# Patient Record
Sex: MALE | Race: WHITE | NOT HISPANIC OR LATINO | URBAN - METROPOLITAN AREA
[De-identification: names, ages, dates, MRNs, and addresses within clinical notes are randomized per-mention and may not be internally consistent; named-entity substitution may affect disease eponyms.]

---

## 2017-01-19 ENCOUNTER — AMBULATORY - HEALTHEAST (OUTPATIENT)
Dept: FAMILY MEDICINE | Facility: CLINIC | Age: 57
End: 2017-01-19

## 2017-01-19 ENCOUNTER — COMMUNICATION - HEALTHEAST (OUTPATIENT)
Dept: FAMILY MEDICINE | Facility: CLINIC | Age: 57
End: 2017-01-19

## 2017-01-19 DIAGNOSIS — M54.50 LOWER BACK PAIN: ICD-10-CM

## 2017-01-19 DIAGNOSIS — I10 HTN (HYPERTENSION): ICD-10-CM

## 2017-01-19 DIAGNOSIS — M54.16 LUMBAR RADICULOPATHY: ICD-10-CM

## 2017-01-19 DIAGNOSIS — E78.00 PURE HYPERCHOLESTEROLEMIA: ICD-10-CM

## 2017-04-25 ENCOUNTER — OFFICE VISIT - HEALTHEAST (OUTPATIENT)
Dept: FAMILY MEDICINE | Facility: CLINIC | Age: 57
End: 2017-04-25

## 2017-04-25 DIAGNOSIS — J06.9 VIRAL URI WITH COUGH: ICD-10-CM

## 2017-04-25 DIAGNOSIS — Z20.818 EXPOSURE TO STREP THROAT: ICD-10-CM

## 2017-07-16 ENCOUNTER — COMMUNICATION - HEALTHEAST (OUTPATIENT)
Dept: FAMILY MEDICINE | Facility: CLINIC | Age: 57
End: 2017-07-16

## 2017-07-16 DIAGNOSIS — I10 HTN (HYPERTENSION): ICD-10-CM

## 2017-07-17 ENCOUNTER — COMMUNICATION - HEALTHEAST (OUTPATIENT)
Dept: FAMILY MEDICINE | Facility: CLINIC | Age: 57
End: 2017-07-17

## 2017-07-17 ENCOUNTER — AMBULATORY - HEALTHEAST (OUTPATIENT)
Dept: FAMILY MEDICINE | Facility: CLINIC | Age: 57
End: 2017-07-17

## 2017-07-17 DIAGNOSIS — M54.16 LUMBAR RADICULOPATHY: ICD-10-CM

## 2017-07-17 DIAGNOSIS — M54.50 LOWER BACK PAIN: ICD-10-CM

## 2017-08-05 ENCOUNTER — COMMUNICATION - HEALTHEAST (OUTPATIENT)
Dept: FAMILY MEDICINE | Facility: CLINIC | Age: 57
End: 2017-08-05

## 2017-08-05 DIAGNOSIS — K21.9 GERD (GASTROESOPHAGEAL REFLUX DISEASE): ICD-10-CM

## 2017-12-12 ENCOUNTER — OFFICE VISIT - HEALTHEAST (OUTPATIENT)
Dept: FAMILY MEDICINE | Facility: CLINIC | Age: 57
End: 2017-12-12

## 2017-12-12 DIAGNOSIS — M25.561 ACUTE PAIN OF RIGHT KNEE: ICD-10-CM

## 2017-12-12 DIAGNOSIS — E66.3 OVERWEIGHT (BMI 25.0-29.9): ICD-10-CM

## 2017-12-12 ASSESSMENT — MIFFLIN-ST. JEOR: SCORE: 1605.15

## 2018-08-30 ENCOUNTER — OFFICE VISIT - HEALTHEAST (OUTPATIENT)
Dept: FAMILY MEDICINE | Facility: CLINIC | Age: 58
End: 2018-08-30

## 2018-08-30 ENCOUNTER — COMMUNICATION - HEALTHEAST (OUTPATIENT)
Dept: SCHEDULING | Facility: CLINIC | Age: 58
End: 2018-08-30

## 2018-08-30 ENCOUNTER — COMMUNICATION - HEALTHEAST (OUTPATIENT)
Dept: FAMILY MEDICINE | Facility: CLINIC | Age: 58
End: 2018-08-30

## 2018-08-30 DIAGNOSIS — F51.01 PRIMARY INSOMNIA: ICD-10-CM

## 2018-08-30 DIAGNOSIS — I73.00 RAYNAUD'S DISEASE WITHOUT GANGRENE: ICD-10-CM

## 2018-08-30 DIAGNOSIS — M25.562 ACUTE PAIN OF LEFT KNEE: ICD-10-CM

## 2018-09-06 ENCOUNTER — COMMUNICATION - HEALTHEAST (OUTPATIENT)
Dept: FAMILY MEDICINE | Facility: CLINIC | Age: 58
End: 2018-09-06

## 2018-09-06 DIAGNOSIS — M25.562 ACUTE PAIN OF LEFT KNEE: ICD-10-CM

## 2018-09-20 ENCOUNTER — RECORDS - HEALTHEAST (OUTPATIENT)
Dept: ADMINISTRATIVE | Facility: OTHER | Age: 58
End: 2018-09-20

## 2018-11-14 ENCOUNTER — OFFICE VISIT - HEALTHEAST (OUTPATIENT)
Dept: FAMILY MEDICINE | Facility: CLINIC | Age: 58
End: 2018-11-14

## 2018-11-14 DIAGNOSIS — M54.50 LOWER BACK PAIN: ICD-10-CM

## 2018-11-14 DIAGNOSIS — M54.16 LUMBAR RADICULOPATHY: ICD-10-CM

## 2018-11-14 DIAGNOSIS — S20.221A CONTUSION, BACK, RIGHT, INITIAL ENCOUNTER: ICD-10-CM

## 2018-11-15 ENCOUNTER — COMMUNICATION - HEALTHEAST (OUTPATIENT)
Dept: FAMILY MEDICINE | Facility: CLINIC | Age: 58
End: 2018-11-15

## 2019-12-27 ENCOUNTER — OFFICE VISIT - HEALTHEAST (OUTPATIENT)
Dept: FAMILY MEDICINE | Facility: CLINIC | Age: 59
End: 2019-12-27

## 2019-12-27 DIAGNOSIS — M62.838 MUSCLE SPASM: ICD-10-CM

## 2021-05-30 VITALS — WEIGHT: 187.9 LBS | BODY MASS INDEX: 28.57 KG/M2

## 2021-05-31 VITALS — HEIGHT: 68 IN | BODY MASS INDEX: 27.76 KG/M2 | WEIGHT: 183.13 LBS

## 2021-06-02 VITALS — BODY MASS INDEX: 28.06 KG/M2 | WEIGHT: 184.56 LBS

## 2021-06-02 VITALS — WEIGHT: 182 LBS | BODY MASS INDEX: 27.67 KG/M2

## 2021-06-04 VITALS
RESPIRATION RATE: 16 BRPM | OXYGEN SATURATION: 98 % | SYSTOLIC BLOOD PRESSURE: 104 MMHG | HEART RATE: 71 BPM | TEMPERATURE: 98.5 F | DIASTOLIC BLOOD PRESSURE: 66 MMHG | WEIGHT: 180.9 LBS | BODY MASS INDEX: 27.51 KG/M2

## 2021-06-04 NOTE — PROGRESS NOTES
Chief Complaint   Patient presents with     Back Pain     BETWEEN THE SHOULDERBLADES - STARTED 2 DAYS AGO AND HAS NOT LET UP - HAVING DIFFICULTY SLEEPING.        HPI:  Vlad Doran is a 59 y.o. male who presents today with back pain located just distal to the R scapiua. certain positions and movement can make it worse. Has hx of back spasms and Has a hx of lumbar back pain and surgery. He denies neck pain, HA, midline pain, trauma, saddle paresthesia, weakness, incoordination, incontinence   He has tried Flexeril, heat, massage, ibuoprifen today which have only mildly helped. And the flexeril is making him drowsy.    Problem List:  2016-11: Arthralgia of wrist, right  2016-02: Raynaud disease  2015-01: Benign neoplasm of colon  2015-01: GERD (gastroesophageal reflux disease)  2015-01: Pure hypercholesterolemia  2015-01: Low back pain  2015-01: Unspecified essential hypertension      Past Medical History:   Diagnosis Date     Dry eye syndrome      GERD (gastroesophageal reflux disease)      Hypertension      Raynaud phenomenon     left hand       Current Outpatient Medications on File Prior to Visit   Medication Sig Dispense Refill     amLODIPine (NORVASC) 5 MG tablet TAKE 1 TABLET (5 MG TOTAL) BY MOUTH DAILY. 30 tablet 0     atorvastatin (LIPITOR) 10 MG tablet TAKE 1 TABLET BY MOUTH DAILY. 30 tablet 0     cyclobenzaprine (FEXMID) 7.5 MG tablet Take 7.5 mg by mouth 3 (three) times a day as needed for muscle spasms.       cycloSPORINE (RESTASIS) 0.05 % ophthalmic emulsion 1 drop 2 (two) times a day.       diclofenac sodium (VOLTAREN) 1 % Gel Apply to effected joint BID as needed 100 g 0     gabapentin (NEURONTIN) 300 MG capsule Take 1 capsule two times a day. 90 capsule 0     CALCIUM & MAGNESIUM CARBONATE (CALCIUM AND MAGNESIUM CARBONAT ORAL) Take by mouth.       ergocalciferol (VITAMIN D2) 50,000 unit capsule Take 50,000 Units by mouth once a week.       omeprazole (PRILOSEC) 20 MG capsule TAKE 1 CAPSULE (20 MG  TOTAL) BY MOUTH DAILY. 90 capsule 1     No current facility-administered medications on file prior to visit.         Social History     Tobacco Use     Smoking status: Light Tobacco Smoker     Types: Cigars     Last attempt to quit: 1992     Years since quittin.9     Smokeless tobacco: Never Used     Tobacco comment: NO VAPING - OCCASIONAL CIGAR   Substance Use Topics     Alcohol use: Yes     Alcohol/week: 6.0 - 8.0 standard drinks     Types: 6 - 8 Standard drinks or equivalent per week       ROS:  As stated in HPI    Vitals:    19 1231   BP: 104/66   Patient Site: Right Arm   Patient Position: Sitting   Cuff Size: Adult Regular   Pulse: 71   Resp: 16   Temp: 98.5  F (36.9  C)   TempSrc: Oral   SpO2: 98%   Weight: 180 lb 14.4 oz (82.1 kg)       Physical Exam:  General: Alert, No apparent distress, Cooperative  SKIN: dry, warm, no rash or lesions seen in areas of exposed skin  HENT: HEAD: ATNC,   EYES: Conjunctiva clear, EOMI  NECK: Supple, non tender, no cervical adenopathy  LUNGS: No respiratory distress,  MSK: UE strength 5/5 with FROM.  Visible and palpable muscle spasm underneath right scapula.  Focal tenderness.  No midline tenderness.  NUERO: AOx3, normal mentation, UE/LE strength 5/5  PSYCH: normal mood and affect      Assessment and Plan:  1. Muscle spasm  Ambulatory referral to Adult PT- External    there is palpable and visible muscle spasm on exam. Advised patient to continue with flexeril and ibuprofen. Follow up with PT for further management including massage, trigger point injection etc. He has been seen by [facility] before and will go back there.   Discussed signs and symptoms that would warrant re evaluation. Patient education printed and given to him.    Lazarus Pak PA-C

## 2021-06-10 NOTE — PROGRESS NOTES
Subjective:      Patient ID: Vlad Doran is a 56 y.o. male.    Chief Complaint:    HPI Vlda Doran is a 56 y.o. male who presents today complaining of cough x 2 days.  His cough is productive he denies any fevers.  He is also having a sore throat that happens at nighttime.  He has a strep exposure through his grandkids.  He denies any shortness of breath or any difficulty swallowing. Patient denies any underlying lung or heart diseases. He received a flu vaccine this year. Patient has also been having a this eye discharge bilaterally when he wakes up, Patient has a history of bronchitis, he has never had pneumonia. Patient hasn't taken any medication OTC. He has nasal congestion that is worse when he lays down at night. Patient has a tender spot on his nose, he sometimes gets it in the spring. Patient has never been treated for allergies.         Past Medical History:   Diagnosis Date     Dry eye syndrome      GERD (gastroesophageal reflux disease)      Hypertension      Raynaud phenomenon     left hand       Past Surgical History:   Procedure Laterality Date     HEMORROIDECTOMY  2013     HERNIA REPAIR       SPINE SURGERY       WRIST SURGERY Right 2009       Family History   Problem Relation Age of Onset     Leukemia Mother      Arthritis Mother      Heart disease Father      Diabetes Father      Diabetes Maternal Grandmother      Stroke Maternal Grandfather      Heart disease Maternal Grandfather      Heart disease Paternal Grandmother      Heart disease Paternal Grandfather      Arthritis Brother        Social History   Substance Use Topics     Smoking status: Former Smoker     Types: Cigars     Quit date: 1/26/1992     Smokeless tobacco: None     Alcohol use Yes       Review of Systems   Constitutional: Positive for fatigue. Negative for fever.   HENT: Positive for congestion and sore throat.    Eyes: Positive for discharge. Negative for pain.   Respiratory: Positive for cough. Negative for shortness of  breath and wheezing.    Cardiovascular: Negative for chest pain.   Gastrointestinal: Negative for abdominal pain, diarrhea, nausea and vomiting.       Objective:     /80  Pulse 62  Temp 98.1  F (36.7  C) (Oral)   Resp 16  Wt 187 lb 14.4 oz (85.2 kg)  SpO2 98%  BMI 28.57 kg/m2    Physical Exam   Constitutional: He appears well-developed and well-nourished.   HENT:   Head: Normocephalic and atraumatic.   Right Ear: Tympanic membrane and external ear normal.   Left Ear: Tympanic membrane and external ear normal.   Nose: Right sinus exhibits frontal sinus tenderness. Right sinus exhibits no maxillary sinus tenderness. Left sinus exhibits frontal sinus tenderness. Left sinus exhibits no maxillary sinus tenderness.   Mouth/Throat: Uvula is midline. Posterior oropharyngeal erythema present. No oropharyngeal exudate or posterior oropharyngeal edema.   Eyes: Conjunctivae are normal.   Neck: Normal range of motion. Neck supple.   Cardiovascular: Normal rate and regular rhythm.  Exam reveals no gallop and no friction rub.    No murmur heard.  Pulmonary/Chest: Effort normal and breath sounds normal. No respiratory distress. He has no wheezes. He has no rales.   Lymphadenopathy:     He has no cervical adenopathy.   Nursing note and vitals reviewed.      Recent Results (from the past 24 hour(s))   Rapid Strep A Screen-Throat   Result Value Ref Range    Rapid Strep A Antigen No Group A Strep detected, presumptive negative No Group A Strep detected, presumptive negative       Procedures      Assessment / Plan:     1. Exposure to strep throat  Rapid Strep A Screen-Throat    Group A Strep, RNA Direct Detection, Throat   2. Viral URI with cough  benzonatate (TESSALON PERLES) 100 MG capsule         Patient Instructions   1) Increase fluids and rest  2)Take Tessalon Perles 1-2 pills three times per day for cough relief.   3) Try Mucinex and Neti pot over the counter for congestion  4) Salt water gargles and lozenges can be  helpful for throat relief  5) You will be notified of the confirmatory strep results via mychart.

## 2021-06-14 NOTE — PROGRESS NOTES
1. Acute pain of right knee     2. Overweight (BMI 25.0-29.9)       Med list reconciled    ASSESSMENT/PLAN:     The following high BMI interventions were performed this visit: encouragement to exercise and weight monitoring    1. Acute pain of right knee    -small cyst on right knee drained using 27 gauge needle    2. Overweight (BMI 25.0-29.9)  -weight stable      There are no Patient Instructions on file for this visit.  Medications Discontinued During This Encounter   Medication Reason     omeprazole (PRILOSEC) 20 MG capsule Duplicate order     Return to clinic if right knee pain or small cyst on right knee becomes severe within the next 7-10 days.    The visit lasted a total of 25 minutes face to face with the patient.  Over 50% of the time spent counseling and educating the patient about above content.      Tiffany Alexander NP          SUBJECTIVE:  Vlad Doran is a 57 y.o. male resents to the clinic reporting 2 days of right knee pain that has been intermittent.  He describes the pain as a sharp, shooting sensation.  Aggravating factors: Kneeling on it.  The pain does not radiate down or up the leg it is localized to the right kneecap.  He denies any trauma to the knee. relieving factors: Patient has not tried any over-the-counter medications, heat or ice packs to the site.  He is rating the pain currently is 0 out of 10.  She does have a history of arthritis in his neck and hands.  He does use Voltaren gel to the hands as needed.  There appears to be a small, freely movable cyst on the right kneecap.  27-gauge needle injected into the cyst, small amount of pus and blood removed from cyst.  Instructed to monitor site, may apply cool ice packs to the knee for the next couple of days and take Tylenol as needed.  Chief Complaint   Patient presents with     Knee Pain     R knee stabbing pain x 2 days, swelling has gone down. Patient was kneeling down on hard floor.         Patient Active Problem List    Diagnosis     Benign neoplasm of colon     GERD (gastroesophageal reflux disease)     Pure hypercholesterolemia     Low back pain     Unspecified essential hypertension     Raynaud disease     Depression     Arthralgia of wrist, right       Current Outpatient Prescriptions   Medication Sig Dispense Refill     amLODIPine (NORVASC) 5 MG tablet TAKE 1 TABLET (5 MG TOTAL) BY MOUTH DAILY. 30 tablet 0     atorvastatin (LIPITOR) 10 MG tablet TAKE 1 TABLET BY MOUTH DAILY. 30 tablet 0     cyclobenzaprine (FEXMID) 7.5 MG tablet Take 7.5 mg by mouth 3 (three) times a day as needed for muscle spasms.       cycloSPORINE (RESTASIS) 0.05 % ophthalmic emulsion 1 drop 2 (two) times a day.       diazePAM (VALIUM) 2 MG tablet TK 1 T PO TID  0     diclofenac sodium (VOLTAREN) 1 % Gel Apply to effected joint BID as needed 100 g 0     gabapentin (NEURONTIN) 300 MG capsule Take 1 capsule TID 90 capsule 0     omeprazole (PRILOSEC) 20 MG capsule TAKE 1 CAPSULE (20 MG TOTAL) BY MOUTH DAILY. 90 capsule 1     CALCIUM & MAGNESIUM CARBONATE (CALCIUM AND MAGNESIUM CARBONAT ORAL) Take by mouth.       ergocalciferol (VITAMIN D2) 50,000 unit capsule Take 50,000 Units by mouth once a week.       No current facility-administered medications for this visit.        History   Smoking Status     Former Smoker     Types: Cigars     Quit date: 1/26/1992   Smokeless Tobacco     Not on file       REVIEW OF SYSTEMS: Denies trauma, locking, clicking, giving away, fevers, chills, sweating, rash or warmth.      TOBACCO USE:  History   Smoking Status     Former Smoker     Types: Cigars     Quit date: 1/26/1992   Smokeless Tobacco     Not on file     Social History     Social History     Marital status:      Spouse name: N/A     Number of children: N/A     Years of education: N/A     Occupational History     Not on file.     Social History Main Topics     Smoking status: Former Smoker     Types: Cigars     Quit date: 1/26/1992     Smokeless tobacco: Not on  file     Alcohol use Yes     Drug use: No     Sexual activity: Not on file     Other Topics Concern     Not on file     Social History Narrative         OBJECTIVE:            Vitals:    12/12/17 1356   BP: 104/80   Pulse: 66   Resp: 16   SpO2: 95%     Weight: 183 lb 2 oz (83.1 kg)  Wt Readings from Last 3 Encounters:   12/12/17 183 lb 2 oz (83.1 kg)   04/25/17 187 lb 14.4 oz (85.2 kg)   11/02/16 187 lb (84.8 kg)     Body mass index is 27.84 kg/(m^2).        Physical Exam:  GENERAL APPEARANCE: A&A, NAD, well hydrated, well nourished  NECK: Supple, without lymphadenopathy, no thyroid mass, no JVD, no bruit  CV: RRR, no M/G/R, no edema   LUNGS: CTAB, normal respiratory effort  ABDOMEN: S&NT, no masses, no organomegaly, BS present x4   EXTREMITY: Right knee with small amount of erythema directly in the center of kneecap, small cyst noted, freely movable, patient has full range of motion, no crepitus detected during extension or flexion, full weightbearing status  SKIN:  Normal skin turgor, warm and dry

## 2021-06-17 NOTE — PATIENT INSTRUCTIONS - HE
Patient Instructions by Lazarus Pak PA-C at 12/27/2019 11:50 AM     Author: Lazarus Pak PA-C Service: -- Author Type: Physician Assistant    Filed: 12/27/2019  1:18 PM Encounter Date: 12/27/2019 Status: Signed    : Lazarus Pak PA-C (Physician Assistant)         Patient Education     Back Spasm (No Trauma)    Spasm of the back muscles can occur after a sudden forceful twisting or bending force (such as in a car accident), after a simple awkward movement, or after lifting something heavy with poor body positioning. In any case, muscle spasm adds to the pain. Sleeping in an awkward position or on a poor quality mattress can also cause this. Some people respond to emotional stress by tensing the muscles of their back.  Pain that continues may need further evaluation or other types of treatment such as physical therapy.  You don't always need X-rays for the initial evaluation of back pain, unless you had a physical injury such as from a car accident or fall. If your pain continues and doesn't respond to medical treatment, X-rays and other tests may then be done.   Home care    As soon as possible, start sitting or walking again to avoid problems from prolonged bed rest (muscle weakness, worsening back stiffness and pain, blood clots in the legs).    When in bed, try to find a position of comfort. A firm mattress is best. Try lying flat on your back with pillows under your knees. You can also try lying on your side with your knees bent up toward your chest and a pillow between your knees.    Avoid prolonged sitting, long car rides, or travel. This puts more stress on the lower back than standing or walking.     During the first 24 to 72 hours after an injury or flare-up, apply an ice pack to the painful area for 20 minutes, then remove it for 20 minutes. Do this over a period of 60 to 90 minutes or several times a day. This will reduce swelling and pain. Always wrap ice packs in a  thin towel.    You can start with ice, then switch to heat. Heat (hot shower, hot bath, or heating pad) reduces pain, and works well for muscle spasms. Apply heat to the painful area for 20 minutes, then remove it for 20 minutes. Do this over a period of 60 to 90 minutes or several times a day. Do not sleep on a heating pad as it can burn or damage skin.    Alternate ice and heat therapies.    Be aware of safe lifting methods and do not lift anything over 15 pounds until all the pain is gone.  Gentle stretching will help your back heal faster. Do this simple routine 2 to 3 times a day until your back is feeling better.    Lie on your back with your knees bent and both feet on the ground    Slowly raise your left knee to your chest as you flatten your lower back against the floor. Hold for 20 to 30 seconds.    Relax and repeat the exercise with your right knee.    Do 2 to 3 of these exercises for each leg.    Repeat, hugging both knees to your chest at the same time.    Do not bounce, but use a gentle pull.  Medicines  Talk to your doctor before using medicine, especially if you have other medical problems or are taking other medicines.  You may use acetaminophen or ibuprofen to control pain, unless your healthcare provider prescribed another pain medicine. If you have a chronic condition such as diabetes, liver or kidney disease, stomach ulcer, or gastrointestinal bleeding, or are taking blood thinners, talk with your healthcare provider before taking any medicines.  Be careful if you are given prescription pain medicine, narcotics, or medicine for muscle spasm. They can cause drowsiness, affect your coordination, reflexes, or judgment. Do not drive or operate heavy machinery when taking these medicines. Take pain medicine only as prescribed by your healthcare provider.  Follow-up care  Follow up with your doctor, or as advised. Physical therapy or further tests may be needed.  If X-rays were taken, they may be  reviewed by a radiologist. You will be notified of any new findings that may affect your care.  Call 911  Call 911 if any of these occur:    Trouble breathing    Confusion    Drowsiness or trouble awakening    Fainting or loss of consciousness    Rapid or very slow heart rate    Loss of bowel or bladder control  When to seek medical advice  Call your healthcare provider right away if any of these occur:    Pain becomes worse or spreads to your legs    Weakness or numbness in one or both legs    Numbness in the groin or genital area    Fever of 100.4 F (38 C) or higher, or as directed by your healthcare provider    Burning or pain when passing urine  Date Last Reviewed: 6/1/2016 2000-2017 [employer]. [address] [place] [address], [place] [address]. All rights reserved. This information is not intended as a substitute for professional medical care. Always follow your healthcare professional's instructions.                 no weight-bearing restrictions

## 2021-06-20 NOTE — PROGRESS NOTES
ASSESSMENT/PLAN:       1. Primary insomnia    - traZODone (DESYREL) 50 MG tablet; Take 1 tablet (50 mg total) by mouth at bedtime.  Dispense: 30 tablet; Refill: 3  I explained to the patient that I would prefer not to prescribe diazepam for insomnia.  When was prescribed in the past it was prescribed to be taken 3 times a day and I suspect it might of been more for anxiety but he was using it for insomnia.  Discussed with him the long-term side effects of this medication and that there are better options for insomnia.  A note that he also has a prescription for cyclobenzaprine which I explained to him has some similar characteristics as Valium.  He takes cyclobenzaprine on a as needed basis for muscle spasms in his back.    2. Acute pain of left knee    - XR Knee Left 1 or 2 VWS  The patient will receive a my chart message with the results of his knee x-ray.  It is okay for him to use the Voltaren gel which he has tried and did seem to help.  Explained to him that from his history and exam today it sounds like he may have a torn meniscus that is likely a degenerative meniscus.  He should try to keep his quadricep muscles strong but limit activities that involve squatting twisting of the knee.  He has had problems with nonsteroidal anti-inflammatory medications upsetting his stomach but certainly could use acetaminophen for pain.  He may also be a candidate for a corticosteroid injection if not improving.    3. Raynaud's disease without gangrene  I suggested to him that he continue his amlodipine.  [facility] but I be happy to see him back as his primary care provider here at the [facility] for ongoing problems and needs.  He seems to be agreeable to that plan    #4 hyperlipidemia  I recommended that he stay on his atorvastatin and have his lipids checked on a yearly basis    The following are part of a depression follow up plan for the patient:  patient follow-up to  return when and if necessary    Hadley Jones MD      PROGRESS NOTE   8/30/2018    SUBJECTIVE:  Vlad Doran is a 57 y.o. male  who presents for   Chief Complaint   Patient presents with     Knee Pain     x 1 month- started clicking, now is snapping all the time and has become swollen and is painful x 3 days      1. Primary insomnia  The patient in the past has used Valium for insomnia but has not had a prescription for well over a year.  [facility] but [facility] not [facility] him Val[facility].  He primarily has trouble staying asleep.  He will use melatonin and it might work for 1 night but then it does not work the next night.  He was requesting something for insomnia.  For the last month the patient has had some left knee pain in the last week he has noticed consistent snapping and swelling in the knee    2. Acute pain of left knee  And the pain is primarily along the medial joint line and the medial patella.  There is an aching sensation in the knee and he did notice yesterday that it seemed to give out a couple times.  No locking of the knee the patient currently is retired and on disability because of back problems.  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  He has not noticed any warmth or redness in the left knee and he actually feels that the swelling is down today.    3. Raynaud's disease without gangrene  The patient uses amlodipine for Raynaud's syndrome and also for blood pressure and is wondering if he should continue on that.  [facility]    4.  Hyperlipidemia  The patient was wondering if she had to stay on his atorvastatin.  [facility] and [facility] do have [facility] work.    Patient Active Problem List   Diagnosis     Benign neoplasm of colon     GERD (gastroesophageal reflux disease)     Pure hypercholesterolemia     Low back pain     Unspecified essential hypertension      Raynaud disease     Depression     Arthralgia of wrist, right       Current Outpatient Prescriptions   Medication Sig Dispense Refill     amLODIPine (NORVASC) 5 MG tablet TAKE 1 TABLET (5 MG TOTAL) BY MOUTH DAILY. 30 tablet 0     atorvastatin (LIPITOR) 10 MG tablet TAKE 1 TABLET BY MOUTH DAILY. 30 tablet 0     CALCIUM & MAGNESIUM CARBONATE (CALCIUM AND MAGNESIUM CARBONAT ORAL) Take by mouth.       cyclobenzaprine (FEXMID) 7.5 MG tablet Take 7.5 mg by mouth 3 (three) times a day as needed for muscle spasms.       cycloSPORINE (RESTASIS) 0.05 % ophthalmic emulsion 1 drop 2 (two) times a day.       diclofenac sodium (VOLTAREN) 1 % Gel Apply to effected joint BID as needed 100 g 0     gabapentin (NEURONTIN) 300 MG capsule Take 1 capsule TID 90 capsule 0     omeprazole (PRILOSEC) 20 MG capsule TAKE 1 CAPSULE (20 MG TOTAL) BY MOUTH DAILY. 90 capsule 1     ergocalciferol (VITAMIN D2) 50,000 unit capsule Take 50,000 Units by mouth once a week.       traZODone (DESYREL) 50 MG tablet Take 1 tablet (50 mg total) by mouth at bedtime. 30 tablet 3     No current facility-administered medications for this visit.        History   Smoking Status     Former Smoker     Types: Cigars     Quit date: 1/26/1992   Smokeless Tobacco     Never Used     Review of systems:  The patient has not noticed any weight change  No fever  No rash  No other joint symptoms other than his chronic arthritic changes in his fingers and also some back and left leg stabbing burning type pain related to his back surgery and back problems      OBJECTIVE:        No results found for this or any previous visit (from the past 240 hour(s)).    Vitals:    08/30/18 0930   BP: 118/78   Patient Position: Sitting   Cuff Size: Adult Large   Pulse: 65   SpO2: 98%   Weight: 182 lb (82.6 kg)     Weight: 182 lb (82.6 kg)        Physical Exam:  GENERAL APPEARANCE: 57-year-old male, NAD, well hydrated, well nourished  SKIN:  Normal skin turgor, no lesions/rashes   HEENT:  moist mucous membranes, no rhinorrhea  EXTREMITY: no edema and full ROM of knee joints, tender along the medial joint line of the left knee no significant effusion noticed no redness or warmth of the joint.  The patient has full active and passive range of motion but with flexion the patient will have a snapping that is audible mildly tender and seems to occur over the medial joint line.  I can reproduce it with passive range of motion as well.  There is no laxity on anterior posterior drawer test.  With the Chitra maneuver there is a popping or snapping sound that is quite noticeable but inconsistent.  NEURO: no focal findings

## 2021-06-21 NOTE — PROGRESS NOTES
ASSESSMENT/PLAN:       1. Contusion, back, right, initial encounter    - XR Lumbar Spine 4 or More VWS, my chart message to the patient with results  Continue to use Voltaren gel, ice heat and gentle massage as well as trying to stay active and not staying in one position for long periods of time.  If the x-ray is normal I would expect for  him to continue to improve over the next couple weeks.    2. Lumbar radiculopathy    - gabapentin (NEURONTIN) 300 MG capsule; Take 1 capsule two times a day.  Dispense: 90 capsule; Refill: 0  [facility] and [facility] list.  The patient is no longer using trazodone as it caused headaches and is managing okay with his insomnia.    3. Lower back pain    - gabapentin (NEURONTIN) 300 MG capsule; Take 1 capsule two times a day.  Dispense: 90 capsule; Refill: 0    BPA for depression but patient does not have any depression and that may be on his list because of trazodone which was not prescribed for depression but rather for insomnia.          Hadley Jones MD      PROGRESS NOTE   11/14/2018    SUBJECTIVE:  Vlad Doran is a 57 y.o. male  who presents for   Chief Complaint   Patient presents with     Fall     11/10/2018, pain in lower right of back      The patient 4 days ago was standing and lost his balance fell backwards and hit his back on a stump.  Initially he did not feel much pain but as he got up he realized there was an injury to his back.  The pain is been on the right lateral low back region over the iliac crest.  There is no radiation of pain although if he pushes on the area he feels that it causes increased paresthesias and tingling in his right foot on the dorsum of the foot.  Of note is that he has some chronic radiculopathy or neuropathy related to his lumbar disease.  About 3 years ago the patient had a major lumbar surgery with fusion and disc surgery.  He has a lot of hardware in his back and he wants to make sure  that everything is still looking okay.  He describes the pain is somewhat of a burning type pain and other than over-the-counter pain medications he is not taking anything.    Patient Active Problem List   Diagnosis     Benign neoplasm of colon     GERD (gastroesophageal reflux disease)     Pure hypercholesterolemia     Low back pain     Unspecified essential hypertension     Raynaud disease     Arthralgia of wrist, right       Current Outpatient Medications   Medication Sig Dispense Refill     amLODIPine (NORVASC) 5 MG tablet TAKE 1 TABLET (5 MG TOTAL) BY MOUTH DAILY. 30 tablet 0     atorvastatin (LIPITOR) 10 MG tablet TAKE 1 TABLET BY MOUTH DAILY. 30 tablet 0     cyclobenzaprine (FEXMID) 7.5 MG tablet Take 7.5 mg by mouth 3 (three) times a day as needed for muscle spasms.       cycloSPORINE (RESTASIS) 0.05 % ophthalmic emulsion 1 drop 2 (two) times a day.       diclofenac sodium (VOLTAREN) 1 % Gel Apply to effected joint BID as needed 100 g 0     gabapentin (NEURONTIN) 300 MG capsule Take 1 capsule two times a day. 90 capsule 0     omeprazole (PRILOSEC) 20 MG capsule TAKE 1 CAPSULE (20 MG TOTAL) BY MOUTH DAILY. 90 capsule 1     CALCIUM & MAGNESIUM CARBONATE (CALCIUM AND MAGNESIUM CARBONAT ORAL) Take by mouth.       ergocalciferol (VITAMIN D2) 50,000 unit capsule Take 50,000 Units by mouth once a week.       No current facility-administered medications for this visit.        Social History     Tobacco Use   Smoking Status Former Smoker     Types: Cigars     Last attempt to quit: 1992     Years since quittin.8   Smokeless Tobacco Never Used           OBJECTIVE:        No results found for this or any previous visit (from the past 240 hour(s)).    Vitals:    18 0815   BP: 108/78   Patient Position: Sitting   Cuff Size: Adult Large   Pulse: 68   SpO2: 98%   Weight: 184 lb 9 oz (83.7 kg)     Weight: 184 lb 9 oz (83.7 kg)          Physical Exam:  GENERAL APPEARANCE: 57-year-old male at rest having no  distress, well hydrated, well nourished  SKIN:  Normal skin turgor, no lesions/rashes   Back exam: There is no bruising swelling or redness in the region of his right low back where he is having his pain.  There is tenderness over the iliac crest on the right side.  He has fairly normal range of motion of his back but at the extremes of motion it makes the pain worse in the right lower back.  No tenderness in the buttock.  His gait is normal.  There is no tenderness over the midline spinous processes or in the region of the facet joints.  EXTREMITY: no edema and full ROM of all joints, right middle finger was dislocated about a week ago and he put it back in place and is still a bit stiff but otherwise working fine with no significant deformities.  There was quite a bit of swelling that has improved.  There is mild tenderness but good stability and fairly normal range of motion in the middle finger.  NEURO: Normal strength in his lower extremity

## 2021-08-08 ENCOUNTER — HEALTH MAINTENANCE LETTER (OUTPATIENT)
Age: 61
End: 2021-08-08

## 2021-10-03 ENCOUNTER — HEALTH MAINTENANCE LETTER (OUTPATIENT)
Age: 61
End: 2021-10-03

## 2022-09-11 ENCOUNTER — HEALTH MAINTENANCE LETTER (OUTPATIENT)
Age: 62
End: 2022-09-11

## 2023-10-07 ENCOUNTER — HEALTH MAINTENANCE LETTER (OUTPATIENT)
Age: 63
End: 2023-10-07

## 2024-02-18 ENCOUNTER — HEALTH MAINTENANCE LETTER (OUTPATIENT)
Age: 64
End: 2024-02-18

## 2025-02-02 ENCOUNTER — APPOINTMENT (OUTPATIENT)
Dept: ULTRASOUND IMAGING | Facility: CLINIC | Age: 65
End: 2025-02-02
Payer: COMMERCIAL

## 2025-02-02 ENCOUNTER — HOSPITAL ENCOUNTER (EMERGENCY)
Facility: CLINIC | Age: 65
Discharge: HOME | End: 2025-02-02
Payer: COMMERCIAL

## 2025-02-02 VITALS
HEART RATE: 64 BPM | RESPIRATION RATE: 16 BRPM | TEMPERATURE: 97.4 F | BODY MASS INDEX: 28.64 KG/M2 | OXYGEN SATURATION: 100 % | WEIGHT: 189 LBS | HEIGHT: 68 IN | SYSTOLIC BLOOD PRESSURE: 149 MMHG | DIASTOLIC BLOOD PRESSURE: 87 MMHG

## 2025-02-02 DIAGNOSIS — M79.661 PAIN OF RIGHT LOWER LEG: ICD-10-CM

## 2025-02-02 PROCEDURE — 99284 EMERGENCY DEPT VISIT MOD MDM: CPT | Mod: 25

## 2025-02-02 PROCEDURE — 93971 EXTREMITY STUDY: CPT | Mod: RT

## 2025-02-02 ASSESSMENT — COLUMBIA-SUICIDE SEVERITY RATING SCALE - C-SSRS
2. HAVE YOU ACTUALLY HAD ANY THOUGHTS OF KILLING YOURSELF IN THE PAST MONTH?: NO
1. IN THE PAST MONTH, HAVE YOU WISHED YOU WERE DEAD OR WISHED YOU COULD GO TO SLEEP AND NOT WAKE UP?: NO
6. HAVE YOU EVER DONE ANYTHING, STARTED TO DO ANYTHING, OR PREPARED TO DO ANYTHING TO END YOUR LIFE?: NO

## 2025-02-02 ASSESSMENT — ACTIVITIES OF DAILY LIVING (ADL): ADLS_ACUITY_SCORE: 41

## 2025-02-02 NOTE — ED TRIAGE NOTES
The patient presents to the ED with right calf pain that began Friday. The patient denies any known or obvious injury. The patient reports the pain is severe and woke him from sleep. He denies noting any redness or swelling. The patient denies history of known blood clots. He does report traveling to [place] in January.

## 2025-02-02 NOTE — DISCHARGE INSTRUCTIONS
You were seen in the emergency department for evaluation of leg pain.  Thankfully this is not a blood clot.  It appears that you are having spasming muscles in the lower leg which is causing your symptoms.  Use Tylenol and ibuprofen as below for pain.  Heat can be very helpful as well to help ease the spasming muscles.  You can try some gentle stretching as well.    You may take 600 mg of ibuprofen (Advil) every 6 hours and 650 mg acetaminophen (Tylenol) every 6 hours for pain. Do not take more than 3200 mg of ibuprofen (Advil) in 24 hours. Do not take more than 3000 mg of acetaminophen (Tylenol) in 24 hours. You may take this much for 1-3 days, then only use as needed.     Return to the emergency department for uncontrollable pain, inability to move the leg, leg swelling, or any other concerning symptoms.

## 2025-02-02 NOTE — ED PROVIDER NOTES
Emergency Department Encounter   NAME: Vlad Doran  AGE: 64 year old male  YOB: 1960  MRN: [id number]    PCP: No primary care provider on file.  ED PROVIDER: Yoanna Cruz PA-C    Evaluation Date & Time:   No admission date for patient encounter.    CHIEF COMPLAINT:  Leg Pain      Impression and Plan   MDM: 64-year-old male with a history of hypertension, hyperlipidemia, osteoarthritis, and lumbar radiculopathy presents for evaluation of intermittent right calf pain over the last 3 days.  On arrival here patient is mildly hypertensive at 148/95, but otherwise vitally stable.  Afebrile.  On my exam patient is in no acute distress.  There is no obvious deformity to the right lower extremity.  No swelling or asymmetry of the lower legs.  No evidence of cellulitis, trauma, compartment syndrome, or vascular compromise.  He has some chronic back pain but this is not a radiating pain to his isolated to his calf.  He has no changes in his back pain or red flag symptoms that would be more concerning for cauda equina or spinal epidural abscess.  No bony tenderness.  I was able to examine the patient while he was actively having an episode of pain.  During this time he had increased muscle tension in the posterior calf.  Felt consistent with spasm.  Discussed that I suspect he is likely having muscle spasms though given history of recent long travel we will move forward with ultrasound of the right lower extremity to assess for DVT which patient is agreeable to.  He declines any pain medication at this time as he does not like to take many medications.  He does have a cyclobenzaprine that he is going to take ago to see if this helps.  Patient is agreeable with the plan.    Ultrasound of the right lower extremity is negative for DVT.  I reassessed the patient.  Is resting comfortably.  We discussed likely muscle spasm.  He just [facility] which did not show any  concerning electrolyte abnormality.  We discussed symptomatic care with Tylenol and ibuprofen.  Can continue with the Flexeril that he has at home as well.  Heat to help with the spasming.  He is going to follow-up with his primary care provider if this is not improving.  We reviewed strict return precautions, signs of DVT, and patient was discharged home in stable condition peer         Medical Decision Making  Obtained supplemental history:Supplemental history obtained?: No  Reviewed external records: External records reviewed?: No  Care impacted by chronic illness:Documented in Chart  Did you consider but not order tests?: Work up considered but not performed and documented in chart, if applicable  Did you interpret images independently?: Independent interpretation of ECG and images noted in documentation, when applicable.  Consultation discussion with other provider:Did you involve another provider (consultant, , pharmacy, etc.)?: No  Discharge. No recommendations on prescription strength medication(s). N/A.    MIPS: Not Applicable        FINAL IMPRESSION:    ICD-10-CM    1. Pain of right lower leg  M79.661             MEDICATIONS GIVEN IN THE EMERGENCY DEPARTMENT:  Medications - No data to display      NEW PRESCRIPTIONS STARTED AT TODAY'S ED VISIT:  New Prescriptions    No medications on file         HPI   Patient information was obtained from: patient   Use of Intrepreter: N/A     Vlda Doran is a 64 year old male with a pertinent history of hypertension, GERD, low back pain, osteoarthritis, and hyperlipidemia who presents to the ED by car for evaluation of right calf pain.  Patient states that 3 days ago he had an episode of severe right calf pain/cramping.  It is in the posterior calf.  He did not experience any this pain yesterday.  But now he has had over 10 episodes of this pain today.  Stretching and walking does not particularly help.  No trauma or swelling or rash or fever.  No numbness or  "tingling.  Came back from [place] a couple of weeks ago.  No history of PE or DVT or coagulation disorders.  No chest pain or shortness of breath.      REVIEW OF SYSTEMS:  Pertinent positive and negative symptoms per HPI.       Physical Exam     First Vitals:  Patient Vitals for the past 24 hrs:   BP Temp Temp src Pulse Resp SpO2 Height Weight   02/02/25 1009 (!) 148/95 97.4  F (36.3  C) Oral 80 16 98 % 1.727 m (5' 8\") 85.7 kg (189 lb)       PHYSICAL EXAM:   General Appearance:  Alert, cooperative, no distress, appears stated age  HENT: Normocephalic without obvious deformity, atraumatic. Mucous membranes moist   Eyes: Conjunctiva clear, Lids normal. No discharge.   Cardiovascular: 2+ PT and DP pulses bilaterally.  Brisk cap refill.  No peripheral edema.  Musculoskeletal: Moving all extremities. No gross deformities.  Calves are symmetric bilaterally.  Right lower extremity: Full ankle and knee range of motion.  No tenderness to palpation along the tibia or fibula.  Compartments are soft.  Integument: Warm, dry, no rashes or lesions  Neurologic: Alert and orientated x3.  Sensation intact to light touch throughout bilateral lower extremities.  Psych: Normal mood and affect      Results     LAB:  All pertinent labs reviewed and interpreted  Labs Ordered and Resulted from Time of ED Arrival to Time of ED Departure - No data to display    RADIOLOGY:  US Lower Extremity Venous Duplex Right   Final Result   IMPRESSION:   1.  No deep venous thrombosis in the right lower extremity.            Yoanna Cruz PA-C   Emergency Medicine   [facility] EMERGENCY ROOM       Yoanna Cruz PA-C  02/02/25 1143    "

## 2025-03-09 ENCOUNTER — HEALTH MAINTENANCE LETTER (OUTPATIENT)
Age: 65
End: 2025-03-09